# Patient Record
(demographics unavailable — no encounter records)

---

## 2024-11-11 NOTE — DISCUSSION/SUMMARY
[FreeTextEntry1] : As per :  "Was on your schedule for 11/12 and was called to reschedule.  She is now on for 12/20 (that week is your 1st available).  At that appointment, she would like to discuss her attention issues - as she is really struggling and can't get anything dne.  Her anxiety is very bad and she does not feel like the increase in meds is helping.  It also doesn't help that she has projects at work she can't focus on to complete and her job has spoken to her about it .. They said they are giving her until the end of the year to work it out.    I had offered to put her in an APA slot earlier but she said she felt you would tell her she isn't being consistent enough with her meds (she sometimes forgets to take it at night).. She feels that is also apart of her attention (or lack thereof) issues too but is going to give it a try this month until she sees you.    She really just wants to get help and while she understands that things take time, she also feels like you are not addressing her attention issues even though she brings it up - you only address the anxiety.  While that is necessary so is her attention issues. "  Will address these issues when i see patient on 12/20

## 2024-12-20 NOTE — PLAN
[FreeTextEntry4] : Assessment: Patient is a 39 yo female with h/o ADD, depression and anxiety seen today for medication management. I-STOP was checked without any problems.  PLAN: Start Adderall ER 5 mg PO QD for ADD Increase Lexapro 10 to 15 mg PO QAM for depression and anxiety. Told her to take it at nighttime.  D/C Zofran 4 mg PRN for nausea - Discussed risks and benefits of medications including side effects of GI and sexual with SSRI. Alternative strategies including no intervention discussed with patient. Patient consents to current medications as prescribed. - Discussed with patient regarding importance of abstinence and sobriety from alcohol and drugs. Educated about relationship between worsening mood/anxiety symptoms and drug use and improvement of symptoms with abstinence.  - Discussed about unpredictable effects including cardiorespiratory collapse from the combination of illicit drugs and prescribed medications. Patient verbalized understanding. - Patient understands to contact clinic prn with concerns and agrees to call 911 or go to nearest ER if symptoms worsen. - Next appointment was made by the patient in 6-8 weeks Patient was not in any distress.

## 2024-12-20 NOTE — PHYSICAL EXAM
[None] : none [Cooperative] : cooperative [Anxious] : anxious [Flat] : flat [Clear] : clear [Preoccupations/Ruminations] : preoccupations/ruminations [None Reported] : none reported [WNL] : within normal limits [Average] : average [FreeTextEntry8] : better [de-identified] : better [FreeTextEntry6] : scattered [de-identified] : fair [de-identified] : fair
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
## 2025-04-25 NOTE — HISTORY OF PRESENT ILLNESS
[FreeTextEntry1] : Patient is here for 3 month follow-up visit via telehealth.  At that time Lexapro 15 mg was increased to 20 mg. States she really liked it. States "The increase surprised me as well." Mood: less depression and anxiety Sleep: she is sick but is getting 6 hours per night. Less naps.  Appetite: better. No nausea Energy: better.  Concentration: better with the Adderall 5 mg.   Motivation: better,  Denies any AVH, SI or HI. +Drug holiday

## 2025-04-25 NOTE — REASON FOR VISIT
[Telehealth (audio & video) - Individual/Group] : This visit was provided via telehealth using real-time 2-way audio visual technology. [Patient preference] : Patient preference. [Medical Office: ([facility])___] : The provider was located at the medical office in [unfilled]. [Home] : The patient, [unfilled], was located at home, [unfilled], at the time of the visit. [Participant(s) identity verified] : Participant(s) identity verified. [Verbal consent obtained from patient/other participant(s)] : Verbal consent for telehealth/telephonic services obtained from patient/other participant(s) [FreeTextEntry1] : "I am having trouble focusing."

## 2025-04-25 NOTE — PLAN
[FreeTextEntry4] : Assessment: Patient is a 37 yo female with h/o ADD, depression and anxiety seen today for medication management. Patient is compliant with the medications, tolerating it well without any side effects. I-STOP was checked without any problems.  I-STOP was checked without any problems.  PLAN: Continue Adderall ER 5 mg PO QD for ADD Continue Lexapro 20 mg PO QAM for depression and anxiety. Told her to take it at nighttime.  D/C Zofran 4 mg PRN for nausea - Discussed risks and benefits of medications including side effects of GI and sexual with SSRI. Alternative strategies including no intervention discussed with patient. Patient consents to current medications as prescribed. - Discussed with patient regarding importance of abstinence and sobriety from alcohol and drugs. Educated about relationship between worsening mood/anxiety symptoms and drug use and improvement of symptoms with abstinence.  - Discussed about unpredictable effects including cardiorespiratory collapse from the combination of illicit drugs and prescribed medications. Patient verbalized understanding. - Patient understands to contact clinic prn with concerns and agrees to call 911 or go to nearest ER if symptoms worsen. - Next appointment was made by the patient in 3months Patient was not in any distress.

## 2025-04-25 NOTE — PHYSICAL EXAM
[None] : none [Cooperative] : cooperative [Anxious] : anxious [Flat] : flat [Clear] : clear [Preoccupations/Ruminations] : preoccupations/ruminations [None Reported] : none reported [WNL] : within normal limits [Average] : average [FreeTextEntry8] : better [de-identified] : better [FreeTextEntry6] : scattered [de-identified] : fair [de-identified] : fair

## 2025-07-18 NOTE — PHYSICAL EXAM
[None] : none [Cooperative] : cooperative [Anxious] : anxious [Flat] : flat [Clear] : clear [Preoccupations/Ruminations] : preoccupations/ruminations [None Reported] : none reported [WNL] : within normal limits [Average] : average [FreeTextEntry8] : better [de-identified] : better [FreeTextEntry6] : scattered [de-identified] : fair [de-identified] : fair

## 2025-07-18 NOTE — HISTORY OF PRESENT ILLNESS
[FreeTextEntry1] : Patient is here for 3 month follow-up visit via telehealth.  No medicaiotn changes at that time. States a lot has happened. She got laid off. She will changing from FT to PT end of Aug. Will increase her hours from 10 hours to 20 hours per week.   Mood: less depression and anxiety. Manageable. Has a lot on ehr plate. Foot problem. May need to move.  Sleep: decreased. Will take the Lexapro at bed time rather than morning. Wakes up in the middle of the night and is unable to go back to sleep.  Appetite: better. No nausea Energy: better.  Concentration: decrease with the Adderall 5 mg.   Motivation: better,  Denies any AVH, SI or HI. +Drug holiday

## 2025-07-18 NOTE — PLAN
[FreeTextEntry4] : Assessment: Patient is a 39 yo female with h/o ADD, depression and anxiety seen today for medication management. Patient is compliant with the medications, tolerating it well without any side effects. I-STOP was checked without any problems.  I-STOP was checked without any problems.  PLAN: Increase Adderall ER 5 to 10 mg PO QD for ADD Continue Lexapro 20 mg PO QAM for depression and anxiety. Told her to take it at nighttime.  D/C Zofran 4 mg PRN for nausea - Discussed risks and benefits of medications including side effects of GI and sexual with SSRI. Alternative strategies including no intervention discussed with patient. Patient consents to current medications as prescribed. - Discussed with patient regarding importance of abstinence and sobriety from alcohol and drugs. Educated about relationship between worsening mood/anxiety symptoms and drug use and improvement of symptoms with abstinence.  - Discussed about unpredictable effects including cardiorespiratory collapse from the combination of illicit drugs and prescribed medications. Patient verbalized understanding. - Patient understands to contact clinic prn with concerns and agrees to call 911 or go to nearest ER if symptoms worsen. - Next appointment was made by the patient in 2 months Patient was not in any distress.

## 2025-07-18 NOTE — REASON FOR VISIT
[Patient preference] : as per patient preference [Telehealth (audio & video) - Individual/Group] : This visit was provided via telehealth using real-time 2-way audio visual technology. [Medical Office: ([facility])___] : The provider was located at the medical office in [unfilled]. [Home] : The patient, [unfilled], was located at home, [unfilled], at the time of the visit. [Patient's space is appropriate for telehealth and maintains privacy/confidentiality.] : Patient's space is appropriate for telehealth and maintains privacy/confidentiality. [Participant(s) identity verified] : Participant(s) identity verified. [Verbal consent obtained from patient/other participant(s)] : Verbal consent for telehealth/telephonic services obtained from patient/other participant(s) [FreeTextEntry1] : "I am having trouble focusing."